# Patient Record
Sex: FEMALE | Race: WHITE | NOT HISPANIC OR LATINO | URBAN - METROPOLITAN AREA
[De-identification: names, ages, dates, MRNs, and addresses within clinical notes are randomized per-mention and may not be internally consistent; named-entity substitution may affect disease eponyms.]

---

## 2018-02-05 ENCOUNTER — OFFICE VISIT (OUTPATIENT)
Dept: INTERNAL MEDICINE | Facility: CLINIC | Age: 68
End: 2018-02-05
Payer: MEDICARE

## 2018-02-05 VITALS
WEIGHT: 141.56 LBS | DIASTOLIC BLOOD PRESSURE: 82 MMHG | HEART RATE: 103 BPM | OXYGEN SATURATION: 96 % | SYSTOLIC BLOOD PRESSURE: 128 MMHG | BODY MASS INDEX: 21.45 KG/M2 | HEIGHT: 68 IN

## 2018-02-05 DIAGNOSIS — J43.9 PULMONARY EMPHYSEMA, UNSPECIFIED EMPHYSEMA TYPE: ICD-10-CM

## 2018-02-05 DIAGNOSIS — Z72.0 TOBACCO ABUSE: ICD-10-CM

## 2018-02-05 PROCEDURE — 3008F BODY MASS INDEX DOCD: CPT | Mod: S$GLB,,, | Performed by: INTERNAL MEDICINE

## 2018-02-05 PROCEDURE — 99203 OFFICE O/P NEW LOW 30 MIN: CPT | Mod: S$GLB,,, | Performed by: INTERNAL MEDICINE

## 2018-02-05 PROCEDURE — 1159F MED LIST DOCD IN RCRD: CPT | Mod: S$GLB,,, | Performed by: INTERNAL MEDICINE

## 2018-02-05 PROCEDURE — 1126F AMNT PAIN NOTED NONE PRSNT: CPT | Mod: S$GLB,,, | Performed by: INTERNAL MEDICINE

## 2018-02-05 PROCEDURE — 99999 PR PBB SHADOW E&M-NEW PATIENT-LVL III: CPT | Mod: PBBFAC,,, | Performed by: INTERNAL MEDICINE

## 2018-02-05 RX ORDER — BUDESONIDE AND FORMOTEROL FUMARATE DIHYDRATE 160; 4.5 UG/1; UG/1
2 AEROSOL RESPIRATORY (INHALATION) EVERY 12 HOURS
Qty: 1 INHALER | Refills: 3 | Status: SHIPPED | OUTPATIENT
Start: 2018-02-05 | End: 2019-02-05

## 2018-02-05 NOTE — PATIENT INSTRUCTIONS
Discharge Instructions: COPD  You have been diagnosed with chronic obstructive pulmonary disease (COPD). This is a name given to a group of diseases that limit the flow of air in and out of your lungs. This makes it harder to breathe. With COPD, you are also more likely to get lung infections. COPD includes chronic bronchitis and emphysema. COPD is most often caused by heavy, long-term cigarette smoking.  Home care  Quit smoking  · If you smoke, quit. It is the best thing you can do for your COPD and your overall health.  · Join a stop-smoking program. There are even telephone, text message, and Internet programs to help you quit.  · Ask your healthcare provider about medicines or other methods to help you quit.  · Ask family members to quit smoking as well.  · Don't allow people to smoke in your home, in your car, or when they are around you.  Protect yourself from infection  · Wash your hands often. Do your best to keep your hands away from your face. Most germs are spread from your hands to your mouth.  · Get a flu shot every year. Also ask your provider about pneumonia vaccines.  · Avoid crowds. It's especially important to do this in the winter when more people have colds and flu.  · To stay healthy, get enough sleep, exercise regularly, and eat a balanced diet. You should:  ¨ Get about 8 hours of sleep every night.  ¨ Try to exercise for at least 30 minutes on most days.  ¨ Have healthy foods including fruits and vegetables, 100% whole grains, lean meats and fish, and low-fat dairy products. Try to stay away from foods high in fats and sugar.  Take your medicines  Take your medicines exactly as directed. Don't skip doses.  Manage your stress  Stress can make COPD worse. Use this stress management technique:  · Find a quiet place and sit or lie in a comfortable position.  · Close your eyes and perform breathing exercises for several minutes. Ask your provider about the best way to breathe.  Pulmonary  rehabilitation  · Pulmonary rehab can help you feel better. These programs include exercise, breathing techniques, information about COPD, counseling, and help for smokers.  · Ask your provider or your local hospital about programs in your area.  When to call your healthcare provider  Call your provider immediately if you have any of the following:  · Shortness of breath, wheezing, or coughing  · Increased mucus  · Yellow, green, bloody, or smelly mucus  · Fever or chills  · Tightness in your chest that does not go away with rest or medicine  · An irregular heartbeat or a feeling that your heart is beating very fast  · Swollen ankles   Date Last Reviewed: 5/1/2016 © 2000-2017 [employer]. [address] [place] [address], [place] [address]. All rights reserved. This information is not intended as a substitute for professional medical care. Always follow your healthcare professional's instructions.        Budesonide; Formoterol Inhalation  What is this medicine?  BUDESONIDE; FORMOTEROL (byoo JASSI oh nide; for MOH te rol) inhalation is a combination of two medicines that decrease inflammation and help to open up the airways in your lungs. It is used to treat asthma or COPD. Do NOT use for an acute attack.  How should I use this medicine?  This medicine is inhaled through the mouth. Rinse your mouth with water after use. Make sure not to swallow the water. Follow the directions on your prescription label. Do not use more often than directed. Do not stop taking except on your doctor's advice. Make sure that you are using your inhaler correctly. Ask your doctor or health care provider if you have any questions.  A special MedGuide will be given to you by the pharmacist with each prescription and refill. Be sure to read this information carefully each time.  Talk to your pediatrician regarding the use of this medicine in children. While this drug may be prescribed for children as young as 12 years of age for selected  conditions, precautions do apply.  What side effects may I notice from receiving this medicine?  Side effects that you should report to your doctor or health care professional as soon as possible:  · allergic reactions such as skin rash or itching, hives, swelling of the face, lips or tongue  · breathing problems  · changes in vision  · chest pain  · fast, irregular heartbeat  · feeling faint or lightheaded, falls  · fever  · high blood pressure  · nervousness  · tremors  · white patches or sores in mouth  Side effects that usually do not require medical attention (report to your doctor or health care professional if they continue or are bothersome):  · cough  · different taste in mouth  · headache  · sore throat  · stuffy nose  · stomach upset  What may interact with this medicine?  Do not take this medicine with any of the following medications:  · MAOIs like Carbex, Eldepryl, Marplan, Nardil, and Parnate  · mifepristone  · probucol  · procarbazine  · some other medicines for asthma like formoterol, salmeterol  This medicine may also interact with the following medications:  · antibiotics like clarithromycin, erythromycin  · cimetidine  · diuretics  · grapefruit juice  · itraconazole  · ketoconazole  · medicines for depression, anxiety, or psychotic disturbances  · medicines for irregular heartbeat  · methadone  · some heart medicines like atenolol, metoprolol  · some other medicines for breathing problems  · some vaccines  What if I miss a dose?  If you miss a dose, use it as soon as you remember. If it is almost time for your next dose, use only that dose and continue with your regular schedule, spacing doses evenly. Do not use double or extra doses.  Where should I keep my medicine?  Keep out of the reach of children.  Store in a dry place at room temperature between 20 and 25 degrees C (68 and 77 degrees F). Do not get the inhaler wet. Keep track of the number of doses used. Throw away the inhaler after using  the marked number of inhalations or after the expiration date, whichever comes first. Do not burn or puncture canister.  What should I tell my health care provider before I take this medicine?  They need to know if you have any of these conditions:  · bone problems  · diabetes  · heart disease or irregular heartbeat  · high blood pressure  · immune system problems  · infection  · liver disease  · worsening asthma  · an unusual or allergic reaction to budesonide, formoterol, medicines, foods, dyes, or preservatives  · pregnant or trying to get pregnant  · breast-feeding  What should I watch for while using this medicine?  Tell your doctor or health care professional if your symptoms do not improve or get worse. If you need to use your short-acting inhalers more often, call your doctor right away. Do not use more than every 12 hours.  If you have asthma, be aware that using this medicine may increase your risk of dying from asthma related problems. Talk to your doctor about the risks and benefits of taking this medicine. NEVER use this medicine for an acute asthma attack.  This medicine may increase your risk of getting an infection. Tell your doctor or health care professional if you are around anyone with measles or chickenpox, or if you develop sores or blisters that do not heal properly.  NOTE:This sheet is a summary. It may not cover all possible information. If you have questions about this medicine, talk to your doctor, pharmacist, or health care provider. Copyright© 2017 [employer]

## 2018-02-05 NOTE — PROGRESS NOTES
Subjective:       Patient ID: Kathia Aguero is a 67 y.o. female.    Chief Complaint: [facility] care, shortness of breath    HPI Mrs. Aguero is a 67-year-old female who presents to [facility] and with a chief complaint of shortness of breath.  She reports that she has had cough and constriction in her chest.  She has been coughing for approximately the last 1 year.  In the beginning of January, she went to an urgent care for her complaints of coughing and congestion in the chest.  While there a chest x-ray was obtained and she was told that she had bronchitis and emphysema.  She was treated with a steroid shot, breathing treatment, antibiotics, cough syrup, and given an inhaler (albuterol).  Since that time she says that her cough has worsened.  She has difficulty getting anything up from her lungs but occasionally coughs out mucus.  She uses her albuterol daily, once in the morning and once in the evening.  She has a significant smoking history; she smoked 1 pack per day for 35 years.  She quit smoking approximately one year ago.  She has never had pulmonary function tests performed.  She has never had a CT scan of the chest.    Review of Systems   Respiratory: Positive for cough and shortness of breath.        Objective:      Physical Exam   Constitutional: She is oriented to person, place, and time. She appears well-developed and well-nourished. No distress.   HENT:   Head: Normocephalic and atraumatic.   Cardiovascular: Normal rate, regular rhythm, normal heart sounds and intact distal pulses.  Exam reveals no gallop and no friction rub.    No murmur heard.  Pulmonary/Chest: No respiratory distress. She has no wheezes. She has no rales.   Poor air movement in all lung fields, but no wheezing, rales, rhonchi. No respiratory distress.   Neurological: She is alert and oriented to person, place, and time.   Skin: Skin is warm and dry. She is not diaphoretic.   Psychiatric: She has a normal mood and affect.  Her behavior is normal. Judgment and thought content normal.   Vitals reviewed.      Assessment:       1. Pulmonary emphysema, unspecified emphysema type    2. Tobacco abuse        Plan:     #1 emphysema  Patient was diagnosed with emphysema based on chest x-ray appearance obtained the beginning of January.  Obtaining pulmonary function tests.  Starting patient on Symbicort today.  She understands that she is to take Symbicort twice daily.  She is only to use her albuterol if she has symptoms such as worsening shortness of breath or worsening cough.  She can use her albuterol up to every 4 hours as needed.  She understands that should she need it more frequently than that, she needs to be evaluated by a physician.  Will see her back in 1-2 weeks.  May add Spiriva at that time depending upon symptom management.    #2 tobacco abuse  Patient informed that based on her significant pack year history and her current symptoms, I recommend a CT scan of the chest for lung cancer screening.  However patient does not want to do a CT scan of the chest at this time.  She prefers to work on symptom management and does not want this study done at this time.    RTC 1-2 weeks

## 2018-02-06 ENCOUNTER — TELEPHONE (OUTPATIENT)
Dept: FAMILY MEDICINE | Facility: CLINIC | Age: 68
End: 2018-02-06

## 2018-02-06 RX ORDER — DOXYCYCLINE HYCLATE 100 MG
TABLET ORAL
COMMUNITY
Start: 2018-01-05

## 2018-02-06 RX ORDER — ONDANSETRON 4 MG/1
TABLET, ORALLY DISINTEGRATING ORAL
COMMUNITY
Start: 2018-01-05

## 2018-02-06 RX ORDER — ALBUTEROL SULFATE 90 UG/1
AEROSOL, METERED RESPIRATORY (INHALATION)
COMMUNITY
Start: 2018-01-05

## 2018-02-06 NOTE — TELEPHONE ENCOUNTER
Spoke to pt daughter who adv at one o clock will come get pt xray disc! Pt verbalizes understanding!

## 2018-02-09 DIAGNOSIS — Z12.11 COLON CANCER SCREENING: ICD-10-CM

## 2018-04-24 ENCOUNTER — TELEPHONE (OUTPATIENT)
Dept: FAMILY MEDICINE | Facility: CLINIC | Age: 68
End: 2018-04-24

## 2018-04-24 NOTE — TELEPHONE ENCOUNTER
----- Message from Vicki Fink sent at 4/24/2018 10:56 AM CDT -----  Contact: [phone]/self  Patient called in requesting to speak with you. Patient prefers to speak with a nurse. Please advise.

## 2018-05-01 ENCOUNTER — TELEPHONE (OUTPATIENT)
Dept: ADMINISTRATIVE | Facility: HOSPITAL | Age: 68
End: 2018-05-01

## 2018-05-18 DIAGNOSIS — Z11.59 NEED FOR HEPATITIS C SCREENING TEST: ICD-10-CM

## 2018-06-15 DIAGNOSIS — Z12.39 BREAST CANCER SCREENING: ICD-10-CM

## 2018-06-15 DIAGNOSIS — Z11.59 NEED FOR HEPATITIS C SCREENING TEST: ICD-10-CM

## 2019-02-15 DIAGNOSIS — Z12.11 COLON CANCER SCREENING: ICD-10-CM

## 2020-10-05 ENCOUNTER — PATIENT MESSAGE (OUTPATIENT)
Dept: ADMINISTRATIVE | Facility: HOSPITAL | Age: 70
End: 2020-10-05

## 2021-01-05 ENCOUNTER — PATIENT MESSAGE (OUTPATIENT)
Dept: ADMINISTRATIVE | Facility: HOSPITAL | Age: 71
End: 2021-01-05

## 2021-04-16 ENCOUNTER — PATIENT MESSAGE (OUTPATIENT)
Dept: RESEARCH | Facility: HOSPITAL | Age: 71
End: 2021-04-16

## 2024-10-16 ENCOUNTER — OFFICE VISIT (OUTPATIENT)
Dept: PULMONOLOGY | Facility: CLINIC | Age: 74
End: 2024-10-16
Payer: MEDICARE

## 2024-10-16 VITALS — OXYGEN SATURATION: 98 % | WEIGHT: 118.81 LBS | BODY MASS INDEX: 18.07 KG/M2 | HEART RATE: 101 BPM

## 2024-10-16 DIAGNOSIS — T78.40XA ALLERGY, UNSPECIFIED, INITIAL ENCOUNTER: ICD-10-CM

## 2024-10-16 DIAGNOSIS — Z87.891 PERSONAL HISTORY OF NICOTINE DEPENDENCE: ICD-10-CM

## 2024-10-16 DIAGNOSIS — L20.9 ATOPIC DERMATITIS, UNSPECIFIED TYPE: ICD-10-CM

## 2024-10-16 DIAGNOSIS — J44.9 CHRONIC OBSTRUCTIVE PULMONARY DISEASE, UNSPECIFIED COPD TYPE: Primary | ICD-10-CM

## 2024-10-16 PROCEDURE — 99204 OFFICE O/P NEW MOD 45 MIN: CPT | Mod: S$GLB,,,

## 2024-10-16 PROCEDURE — 99999 PR PBB SHADOW E&M-NEW PATIENT-LVL III: CPT | Mod: PBBFAC,,,

## 2024-10-16 PROCEDURE — 3008F BODY MASS INDEX DOCD: CPT | Mod: CPTII,S$GLB,,

## 2024-10-16 PROCEDURE — 1126F AMNT PAIN NOTED NONE PRSNT: CPT | Mod: CPTII,S$GLB,,

## 2024-10-16 PROCEDURE — 3288F FALL RISK ASSESSMENT DOCD: CPT | Mod: CPTII,S$GLB,,

## 2024-10-16 PROCEDURE — 1159F MED LIST DOCD IN RCRD: CPT | Mod: CPTII,S$GLB,,

## 2024-10-16 PROCEDURE — 1101F PT FALLS ASSESS-DOCD LE1/YR: CPT | Mod: CPTII,S$GLB,,

## 2024-10-16 RX ORDER — ALBUTEROL SULFATE 90 UG/1
2 INHALANT RESPIRATORY (INHALATION) EVERY 4 HOURS PRN
Qty: 18 G | Refills: 11 | Status: SHIPPED | OUTPATIENT
Start: 2024-10-16

## 2024-10-16 RX ORDER — TRIAMCINOLONE ACETONIDE 1 MG/G
CREAM TOPICAL 2 TIMES DAILY
Qty: 15 G | Refills: 2 | Status: SHIPPED | OUTPATIENT
Start: 2024-10-16

## 2024-10-16 RX ORDER — LEVALBUTEROL INHALATION SOLUTION 1.25 MG/3ML
1 SOLUTION RESPIRATORY (INHALATION) EVERY 4 HOURS PRN
Qty: 120 ML | Refills: 0 | Status: SHIPPED | OUTPATIENT
Start: 2024-10-16 | End: 2025-10-16

## 2024-10-16 RX ORDER — FLUTICASONE PROPIONATE 50 MCG
1 SPRAY, SUSPENSION (ML) NASAL DAILY
Qty: 16 G | Refills: 11 | Status: SHIPPED | OUTPATIENT
Start: 2024-10-16

## 2024-10-16 RX ORDER — LEVOCETIRIZINE DIHYDROCHLORIDE 5 MG/1
5 TABLET, FILM COATED ORAL NIGHTLY
Qty: 30 TABLET | Refills: 11 | Status: SHIPPED | OUTPATIENT
Start: 2024-10-16 | End: 2025-10-16

## 2024-10-16 NOTE — PATIENT INSTRUCTIONS
-Obtain PFT to evaluate for obstructive or restrictive pattern  -Obtain IgE and CBC to check inflammatory markers  -Obtain CT chest to screen lung cancer and check lung fields       COPD Management Plan  -Breztri for maintenance therapy. Rinse mouth after each use.   -Xopenex for rescue therapy. 1-2 puffs as needed for shortness of breath or wheezing  -Xopenex treatments for shortness of breath or wheezing.  -Start xyzal instead of the benadryl   -Start flonase daily

## 2024-10-16 NOTE — PROGRESS NOTES
History and Physical Note  [facility]    Subjective:     Reason for visit: COPD Management     Patient ID:  Kathia Aguero is a 74 y.o. female    Interval History 10/16/2024:  Patient is here with her daughter for COPD management. It has been many years since she was told she had a COPD diagnosis. No formal PFT's on file. She was previously taking Trelegy for this but she had terrible side effects. She developed nausea and bumps on the roof of her mouth from Trelegy use. She is bothered by a persistent cough.   Cough: daily productive coughing   Sputum: clear    MMRC grade level: 3   Respiratory illness: none in the last 3 months   Last ED/UC visit: None;   Current COPD treatment plan: ventolin PRN, albuterol nebulizer solution as needed. The nebulizer solution provides short term relief but it causes jittery feelings and palpitations. She would like an alternative since side effects keep her from using the treatment.     She struggling with chronic allergic rhinits. She has been treating this with with OTC benadryl up to 4-5 pills a day.     Additional Pulmonary History:  Occupational: none; remodeling work on houses in the [place];  Environmental Exposures: pollen;   Ex[facility] to Animals/Pets: [facility] ([facility] [employer])  Family History of Lung Cancer: none;   Childhood Illnesses:  none;   Tobacco/Smoking: Former cigarette smoker; quit 8 years. 36 total pack years. She has started vaping with nicotine in an effort to quit cigarettes.     Social History     Tobacco Use   Smoking Status Former    Current packs/day: 0.00    Average packs/day: 1 pack/day for 36.0 years (36.0 ttl pk-yrs)    Types: Vaping with nicotine, Cigarettes    Start date:     Quit date: 2016    Years since quittin.7   Smokeless Tobacco Never       Objective:     Vitals:    10/16/24 1415   Pulse: 101   SpO2: 98%   Weight: 53.9 kg (118 lb 13.3 oz)         Physical Exam  Constitutional:       Appearance: Normal appearance. She is  "well-developed.   HENT:      Head: Normocephalic.   Cardiovascular:      Rate and Rhythm: Normal rate.      Pulses: Normal pulses.      Heart sounds: Normal heart sounds, S1 normal and S2 normal.   Pulmonary:      Effort: Pulmonary effort is normal.      Breath sounds: Normal breath sounds. No decreased breath sounds.   Musculoskeletal:      Right lower leg: No edema.      Left lower leg: No edema.   Skin:     General: Skin is warm.      Capillary Refill: Capillary refill takes less than 2 seconds.      Findings: No rash.      Nails: There is no clubbing.   Neurological:      Mental Status: She is alert and oriented to person, place, and time. Mental status is at baseline.   Psychiatric:         Attention and Perception: Attention normal.         Mood and Affect: Mood and affect normal.         Speech: Speech normal.         Behavior: Behavior is cooperative.          Personal Diagnostic Review and Interpretation  pending  Pertinent Studies Reviewed & Interpreted:     Pulmonary Function Tests:   pending    Other Pertinent Laboratories:  No results found for: "WBC", "RBC", "HGB", "MCV", "MCH", "MCHC", "RDW", "PLT", "MPV", "GRAN", "LYMPH", "MONO", "EOS", "BASO"      Assessment & Plan:       Plan:  Clinical impression is resonably certain and repeated evaluation prn +/- follow up will be needed as below.      1. Chronic obstructive pulmonary disease, unspecified COPD type  Overview:  -Obtain PFT to evaluate for obstructive or restrictive pattern  -Obtain IgE and CBC to check inflammatory markers  -Obtain CT chest to screen lung cancer and check lung fields       COPD Management Plan  -Breztri for maintenance therapy. Rinse mouth after each use.   -Xopenex for rescue therapy. 1-2 puffs as needed for shortness of breath or wheezing  -Xopenex treatments for shortness of breath or wheezing.  -Start xyzal instead of the benadryl   -Start flonase daily     Assessment & Plan:  The patient expressed some hesitancy about moving " forward with the PFT and CT chest at this time. She stated that she prefers to take some time to do her own research and consider all available options before making a decision. She appreciates the recommendations provided but would like to ensure she is fully informed before proceeding. This is reasonable. Tests and imaging orders placed. Patient given our contact if she would like to move forward with scheduling.     We will follow up in 1 month to see if symptoms are improving with new treatments.     Orders:  -     CBC auto differential; Future; Expected date: 10/16/2024  -     IGE; Future; Expected date: 10/16/2024  -     Complete PFT with bronchodilator; Future  -     levocetirizine (XYZAL) 5 MG tablet; Take 1 tablet (5 mg total) by mouth every evening.  Dispense: 30 tablet; Refill: 11  -     levalbuterol (XOPENEX) 1.25 mg/3 mL nebulizer solution; Take 3 mLs (1.25 mg total) by nebulization every 4 (four) hours as needed for Wheezing. Rescue  Dispense: 120 mL; Refill: 0  -     albuterol (VENTOLIN HFA) 90 mcg/actuation inhaler; Inhale 2 puffs into the lungs every 4 (four) hours as needed for Wheezing or Shortness of Breath. Rescue  Dispense: 18 g; Refill: 11  -     budesonide-glycopyr-formoterol 160-9-4.8 mcg/actuation HFAA; Inhale 2 puffs into the lungs 2 (two) times a day.  Dispense: 10.7 g; Refill: 11  -     CT Chest Lung Screening Low Dose; Future; Expected date: 10/16/2024  -     fluticasone propionate (FLONASE) 50 mcg/actuation nasal spray; 1 spray (50 mcg total) by Each Nostril route once daily.  Dispense: 16 g; Refill: 11    2. Personal history of nicotine dependence  Overview:  -Recommendation to screen for lung cancer with low dose CT based on history. Patient would like to hold off on scheduling this test. She needs more time to make a decision. This is reasonable. Risk and benefits discussed with patien.     Risk vs. Benefit of Lung Cancer Screening Discuss  The primary benefit of undergoing this  screening is the potential to identify lung cancer at an early, more treatable stage. Early detection can significantly increase the chances of successful treatment and survival. For high-risk individuals, such as long-term smokers or those with a significant history of smoking, the benefits of early detection often outweigh the risks.  However, there are also risks associated with Chest CT screening. The procedure exposes you to a small amount of radiation, which can accumulate over time with repeated scans and potentially increase the risk of developing other cancers. There is also the possibility of false positives, where the scan might detect an abnormality that is not cancer, leading to unnecessary anxiety, further tests, and procedures that carry their own risks and complications. Additionally, false negatives can occur, where existing cancer is not detected.         Orders:  -     CBC auto differential; Future; Expected date: 10/16/2024  -     IGE; Future; Expected date: 10/16/2024  -     Complete PFT with bronchodilator; Future  -     levocetirizine (XYZAL) 5 MG tablet; Take 1 tablet (5 mg total) by mouth every evening.  Dispense: 30 tablet; Refill: 11  -     levalbuterol (XOPENEX) 1.25 mg/3 mL nebulizer solution; Take 3 mLs (1.25 mg total) by nebulization every 4 (four) hours as needed for Wheezing. Rescue  Dispense: 120 mL; Refill: 0  -     albuterol (VENTOLIN HFA) 90 mcg/actuation inhaler; Inhale 2 puffs into the lungs every 4 (four) hours as needed for Wheezing or Shortness of Breath. Rescue  Dispense: 18 g; Refill: 11  -     budesonide-glycopyr-formoterol 160-9-4.8 mcg/actuation HFAA; Inhale 2 puffs into the lungs 2 (two) times a day.  Dispense: 10.7 g; Refill: 11  -     CT Chest Lung Screening Low Dose; Future; Expected date: 10/16/2024    3. Allergy, unspecified, initial encounter  Overview:  Stop benadryl  Start xyzal  Obtain blood work to check allergic markers     Orders:  -     IGE; Future;  Expected date: 10/16/2024  -     fluticasone propionate (FLONASE) 50 mcg/actuation nasal spray; 1 spray (50 mcg total) by Each Nostril route once daily.  Dispense: 16 g; Refill: 11    4. Atopic dermatitis, unspecified type  Overview:  -Start kenalog for flare ups    Orders:  -     triamcinolone acetonide 0.1% (KENALOG) 0.1 % cream; Apply topically 2 (two) times daily.  Dispense: 15 g; Refill: 2        Follow up in about 4 weeks (around 11/13/2024), or if symptoms worsen or fail to improve, for Test results .    Discussed with patient above for education the following:      Patient Instructions   -Obtain PFT to evaluate for obstructive or restrictive pattern  -Obtain IgE and CBC to check inflammatory markers  -Obtain CT chest to screen lung cancer and check lung fields       COPD Management Plan  -Breztri for maintenance therapy. Rinse mouth after each use.   -Xopenex for rescue therapy. 1-2 puffs as needed for shortness of breath or wheezing  -Xopenex treatments for shortness of breath or wheezing.  -Start xyzal instead of the benadryl   -Start flonase daily     RETURN TO CLINIC IN 1 MONTHS     Total professional time spent for the encounter: 58 minutes  Time was spent preparing to see the patient, reviewing results of prior testing, obtaining and/or reviewing separately obtained history, performing a medically appropriate examination and interview, counseling and educating the patient/family, ordering medications/tests/procedures, referring and communicating with other health care professionals, documenting clinical information in the electronic health record, and independently interpreting results.    Ophelia Bassett, DNP

## 2024-10-16 NOTE — ASSESSMENT & PLAN NOTE
The patient expressed some hesitancy about moving forward with the PFT and CT chest at this time. She stated that she prefers to take some time to do her own research and consider all available options before making a decision. She appreciates the recommendations provided but would like to ensure she is fully informed before proceeding. This is reasonable. Tests and imaging orders placed. Patient given our contact if she would like to move forward with scheduling.     We will follow up in 1 month to see if symptoms are improving with new treatments.

## 2024-11-25 ENCOUNTER — TELEPHONE (OUTPATIENT)
Dept: PULMONOLOGY | Facility: CLINIC | Age: 74
End: 2024-11-25
Payer: MEDICARE

## 2024-11-25 NOTE — TELEPHONE ENCOUNTER
Patient had question about her inhalers. Contacted pharmacy to verify patient picked up correct inhaler. We discussed continuing inhalers with good adherence and to monitor for symptom improvement. We will meet in two weeks to discuss,.      ----- Message from Nurse Barreto sent at 11/25/2024  1:56 PM CST -----  Regarding: FW: pt advice  Contact: [phone]    ----- Message -----  From: Ailyn Salazar  Sent: 11/25/2024   1:36 PM CST  To: [facility] Staff  Subject: pt advice                                        Pt is calling to speak with someone in provider office in regards to discussing medications that the provider prescribed to the pt , pt  is asking for a return call please call pt at   [phone]

## 2024-12-09 ENCOUNTER — OFFICE VISIT (OUTPATIENT)
Dept: PULMONOLOGY | Facility: CLINIC | Age: 74
End: 2024-12-09
Payer: MEDICARE

## 2024-12-09 VITALS
OXYGEN SATURATION: 97 % | WEIGHT: 129.06 LBS | BODY MASS INDEX: 19.63 KG/M2 | DIASTOLIC BLOOD PRESSURE: 79 MMHG | HEART RATE: 80 BPM | SYSTOLIC BLOOD PRESSURE: 131 MMHG

## 2024-12-09 DIAGNOSIS — Z87.891 PERSONAL HISTORY OF NICOTINE DEPENDENCE: ICD-10-CM

## 2024-12-09 DIAGNOSIS — J44.9 CHRONIC OBSTRUCTIVE PULMONARY DISEASE, UNSPECIFIED COPD TYPE: Primary | ICD-10-CM

## 2024-12-09 DIAGNOSIS — T78.40XA ALLERGY, UNSPECIFIED, INITIAL ENCOUNTER: ICD-10-CM

## 2024-12-09 PROCEDURE — 3075F SYST BP GE 130 - 139MM HG: CPT | Mod: CPTII,S$GLB,,

## 2024-12-09 PROCEDURE — 1160F RVW MEDS BY RX/DR IN RCRD: CPT | Mod: CPTII,S$GLB,,

## 2024-12-09 PROCEDURE — 3288F FALL RISK ASSESSMENT DOCD: CPT | Mod: CPTII,S$GLB,,

## 2024-12-09 PROCEDURE — 1101F PT FALLS ASSESS-DOCD LE1/YR: CPT | Mod: CPTII,S$GLB,,

## 2024-12-09 PROCEDURE — 3078F DIAST BP <80 MM HG: CPT | Mod: CPTII,S$GLB,,

## 2024-12-09 PROCEDURE — 1159F MED LIST DOCD IN RCRD: CPT | Mod: CPTII,S$GLB,,

## 2024-12-09 PROCEDURE — 3008F BODY MASS INDEX DOCD: CPT | Mod: CPTII,S$GLB,,

## 2024-12-09 PROCEDURE — 1126F AMNT PAIN NOTED NONE PRSNT: CPT | Mod: CPTII,S$GLB,,

## 2024-12-09 PROCEDURE — 99213 OFFICE O/P EST LOW 20 MIN: CPT | Mod: S$GLB,,,

## 2024-12-09 PROCEDURE — 99999 PR PBB SHADOW E&M-EST. PATIENT-LVL III: CPT | Mod: PBBFAC,,,

## 2024-12-09 NOTE — PATIENT INSTRUCTIONS
-Obtain PFT to evaluate for obstructive or restrictive pattern  -Obtain IgE and CBC to check inflammatory markers  -Obtain CT chest

## 2024-12-09 NOTE — PROGRESS NOTES
History and Physical Note  [facility]    Subjective:     Reason for visit: COPD Management     Patient ID:  Kathia Aguero is a 74 y.o. female    Interval History 12/09/2024:   Patient is here for follow up on her COPD management. She would like to defer CT scan. She is open to getting PFT for diagnostic. Flonase has not been used. Xyzal did not work for allergies. No heart burn and no post prandial couigh.   Cough: daily  Sputum: tinged white color  MMRC grade level: 4; exertion and rest   Respiratory illness: none in the last 3 months   Exercise: limited by dyspnea  Current COPD treatment plan: breztri twice daily; albuterol did not help at all    Interval History 10/16/2024:  Patient is here with her daughter for COPD management. It has been many years since she was told she had a COPD diagnosis. No formal PFT's on file. She was previously taking Trelegy for this but she had terrible side effects. She developed nausea and bumps on the roof of her mouth from Trelegy use. She is bothered by a persistent cough.   Cough: daily productive coughing   Sputum: clear    MMRC grade level: 3   Respiratory illness: none in the last 3 months   Last ED/UC visit: None;   Current COPD treatment plan: ventolin PRN, albuterol nebulizer solution as needed. The nebulizer solution provides short term relief but it causes jittery feelings and palpitations. She would like an alternative since side effects keep her from using the treatment.     She struggling with chronic allergic rhinits. She has been treating this with with OTC benadryl up to 4-5 pills a day.     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 36 total pack years. She has started vaping with nicotine in  "an effort to quit cigarettes.     Social History     Tobacco Use   Smoking Status Former    Current packs/day: 0.00    Average packs/day: 1 pack/day for 36.0 years (36.0 ttl pk-yrs)    Types: Vaping with nicotine, Cigarettes    Start date:     Quit date: 2016    Years since quittin.9   Smokeless Tobacco Never       Objective:     Vitals:    24 1549   BP: 131/79   BP Location: Left arm   Patient Position: Sitting   Pulse: 80   SpO2: 97%   Weight: 58.6 kg (129 lb 1.3 oz)           Physical Exam  Constitutional:       Appearance: Normal appearance. She is well-developed.   HENT:      Head: Normocephalic.   Cardiovascular:      Rate and Rhythm: Normal rate.      Pulses: Normal pulses.      Heart sounds: Normal heart sounds, S1 normal and S2 normal.   Pulmonary:      Effort: Pulmonary effort is normal.      Breath sounds: Normal breath sounds. No decreased breath sounds.   Musculoskeletal:      Right lower leg: No edema.      Left lower leg: No edema.   Skin:     General: Skin is warm.      Capillary Refill: Capillary refill takes less than 2 seconds.      Findings: No rash.      Nails: There is no clubbing.   Neurological:      Mental Status: She is alert and oriented to person, place, and time. Mental status is at baseline.   Psychiatric:         Attention and Perception: Attention normal.         Mood and Affect: Mood and affect normal.         Speech: Speech normal.         Behavior: Behavior is cooperative.          Personal Diagnostic Review and Interpretation  pending  Pertinent Studies Reviewed & Interpreted:     Pulmonary Function Tests:   pending    Other Pertinent Laboratories:  No results found for: "WBC", "RBC", "HGB", "MCV", "MCH", "MCHC", "RDW", "PLT", "MPV", "GRAN", "LYMPH", "MONO", "EOS", "BASO"      Assessment & Plan:       Plan:  Clinical impression is resonably certain and repeated evaluation prn +/- follow up will be needed as below.      1. Chronic obstructive pulmonary disease, " unspecified COPD type  Overview:  -Obtain PFT to evaluate for obstructive or restrictive pattern  -Obtain IgE and CBC to check inflammatory markers  -Obtain CT chest to screen lung cancer and check lung fields       COPD Management Plan  -Breztri for maintenance therapy. Rinse mouth after each use.   -Xopenex for rescue therapy. 1-2 puffs as needed for shortness of breath or wheezing  -Xopenex treatments for shortness of breath or wheezing.  -Start xyzal instead of the benadryl   -Start flonase daily     Assessment & Plan:  Symptoms have not improved with triple therapy inhaler. At this time, I recommend we obtain diagnostic tests. Patient is agreeable to PFT and bloodwork. WE have scheduled these on her behalf. She is concerned about radiation exposure from CT scan. We discussed using low dose radiation CT. She would like to defer at this time.      2. Allergy, unspecified, initial encounter  Overview:  Stop benadryl  Start xyzal  Obtain blood work to check allergic markers       3. Personal history of nicotine dependence  Overview:  -Recommendation to screen for lung cancer with low dose CT based on history. Patient would like to hold off on scheduling this test.             Follow up in about 6 weeks (around 1/20/2025), or if symptoms worsen or fail to improve.    Discussed with patient above for education the following:      Patient Instructions   -Obtain PFT to evaluate for obstructive or restrictive pattern  -Obtain IgE and CBC to check inflammatory markers  -Obtain CT chest     RETURN TO CLINIC IN 3 MONTHS     Total professional time spent for the encounter: 22 minutes  Time was spent preparing to see the patient, reviewing results of prior testing, obtaining and/or reviewing separately obtained history, performing a medically appropriate examination and interview, counseling and educating the patient/family, ordering medications/tests/procedures, referring and communicating with other health care  professionals, documenting clinical information in the electronic health record, and independently interpreting results.    Ophelia Bassett, DNP

## 2024-12-10 NOTE — ASSESSMENT & PLAN NOTE
Symptoms have not improved with triple therapy inhaler. At this time, I recommend we obtain diagnostic tests. Patient is agreeable to PFT and bloodwork. WE have scheduled these on her behalf. She is concerned about radiation exposure from CT scan. We discussed using low dose radiation CT. She would like to defer at this time.

## 2025-01-08 DIAGNOSIS — J44.9 CHRONIC OBSTRUCTIVE PULMONARY DISEASE, UNSPECIFIED COPD TYPE: Primary | ICD-10-CM

## 2025-01-08 RX ORDER — AZITHROMYCIN 250 MG/1
TABLET, FILM COATED ORAL
Qty: 6 TABLET | Refills: 0 | Status: SHIPPED | OUTPATIENT
Start: 2025-01-08 | End: 2025-01-13

## 2025-01-08 RX ORDER — PREDNISONE 20 MG/1
20 TABLET ORAL DAILY
Qty: 5 TABLET | Refills: 0 | Status: SHIPPED | OUTPATIENT
Start: 2025-01-08

## 2025-01-29 ENCOUNTER — TELEPHONE (OUTPATIENT)
Dept: PULMONOLOGY | Facility: CLINIC | Age: 75
End: 2025-01-29
Payer: MEDICARE

## 2025-04-04 ENCOUNTER — TELEPHONE (OUTPATIENT)
Dept: PULMONOLOGY | Facility: CLINIC | Age: 75
End: 2025-04-04
Payer: MEDICARE

## 2025-04-04 DIAGNOSIS — J44.9 CHRONIC OBSTRUCTIVE PULMONARY DISEASE, UNSPECIFIED COPD TYPE: Primary | ICD-10-CM

## 2025-04-04 RX ORDER — FLUTICASONE FUROATE, UMECLIDINIUM BROMIDE AND VILANTEROL TRIFENATATE 100; 62.5; 25 UG/1; UG/1; UG/1
1 POWDER RESPIRATORY (INHALATION) DAILY
Qty: 60 EACH | Refills: 11 | Status: SHIPPED | OUTPATIENT
Start: 2025-04-04

## 2025-04-04 NOTE — TELEPHONE ENCOUNTER
Message was left to let Pt know that her percription [id number] [place] - [address]   [address] [id number]   CF.